# Patient Record
Sex: MALE | Race: WHITE | URBAN - METROPOLITAN AREA
[De-identification: names, ages, dates, MRNs, and addresses within clinical notes are randomized per-mention and may not be internally consistent; named-entity substitution may affect disease eponyms.]

---

## 2017-11-16 ENCOUNTER — HOSPITAL ENCOUNTER (EMERGENCY)
Facility: CLINIC | Age: 42
Discharge: HOME | End: 2017-11-16
Attending: EMERGENCY MEDICINE | Admitting: EMERGENCY MEDICINE
Payer: COMMERCIAL

## 2017-11-16 ENCOUNTER — APPOINTMENT (OUTPATIENT)
Dept: CT IMAGING | Facility: CLINIC | Age: 42
End: 2017-11-16
Attending: EMERGENCY MEDICINE
Payer: COMMERCIAL

## 2017-11-16 VITALS
OXYGEN SATURATION: 95 % | SYSTOLIC BLOOD PRESSURE: 111 MMHG | HEIGHT: 74 IN | DIASTOLIC BLOOD PRESSURE: 77 MMHG | RESPIRATION RATE: 18 BRPM | WEIGHT: 250 LBS | HEART RATE: 61 BPM | TEMPERATURE: 98.1 F | BODY MASS INDEX: 32.08 KG/M2

## 2017-11-16 DIAGNOSIS — N20.1 URETERAL STONE: ICD-10-CM

## 2017-11-16 DIAGNOSIS — N20.0 BILATERAL NEPHROLITHIASIS: ICD-10-CM

## 2017-11-16 LAB
ALBUMIN SERPL-MCNC: 4 G/DL (ref 3.4–5)
ALBUMIN UR-MCNC: 10 MG/DL
ALP SERPL-CCNC: 84 U/L (ref 40–150)
ALT SERPL W P-5'-P-CCNC: 20 U/L (ref 0–70)
ANION GAP SERPL CALCULATED.3IONS-SCNC: 8 MMOL/L (ref 3–14)
APPEARANCE UR: CLEAR
APTT PPP: 32 SEC (ref 22–37)
AST SERPL W P-5'-P-CCNC: 15 U/L (ref 0–45)
BASOPHILS # BLD AUTO: 0 10E9/L (ref 0–0.2)
BASOPHILS NFR BLD AUTO: 0.4 %
BILIRUB SERPL-MCNC: 1 MG/DL (ref 0.2–1.3)
BILIRUB UR QL STRIP: NEGATIVE
BUN SERPL-MCNC: 14 MG/DL (ref 7–30)
CALCIUM SERPL-MCNC: 8.6 MG/DL (ref 8.5–10.1)
CHLORIDE SERPL-SCNC: 107 MMOL/L (ref 94–109)
CO2 SERPL-SCNC: 26 MMOL/L (ref 20–32)
COLOR UR AUTO: YELLOW
CREAT SERPL-MCNC: 1.24 MG/DL (ref 0.66–1.25)
DIFFERENTIAL METHOD BLD: NORMAL
EOSINOPHIL # BLD AUTO: 0.3 10E9/L (ref 0–0.7)
EOSINOPHIL NFR BLD AUTO: 2.9 %
ERYTHROCYTE [DISTWIDTH] IN BLOOD BY AUTOMATED COUNT: 12.3 % (ref 10–15)
GFR SERPL CREATININE-BSD FRML MDRD: 65 ML/MIN/1.7M2
GLUCOSE SERPL-MCNC: 107 MG/DL (ref 70–99)
GLUCOSE UR STRIP-MCNC: NEGATIVE MG/DL
HCT VFR BLD AUTO: 47.5 % (ref 40–53)
HGB BLD-MCNC: 16.8 G/DL (ref 13.3–17.7)
HGB UR QL STRIP: ABNORMAL
IMM GRANULOCYTES # BLD: 0 10E9/L (ref 0–0.4)
IMM GRANULOCYTES NFR BLD: 0.2 %
INR PPP: 0.9 (ref 0.86–1.14)
KETONES UR STRIP-MCNC: NEGATIVE MG/DL
LEUKOCYTE ESTERASE UR QL STRIP: ABNORMAL
LIPASE SERPL-CCNC: 127 U/L (ref 73–393)
LYMPHOCYTES # BLD AUTO: 4.2 10E9/L (ref 0.8–5.3)
LYMPHOCYTES NFR BLD AUTO: 46.5 %
MCH RBC QN AUTO: 31.7 PG (ref 26.5–33)
MCHC RBC AUTO-ENTMCNC: 35.4 G/DL (ref 31.5–36.5)
MCV RBC AUTO: 90 FL (ref 78–100)
MONOCYTES # BLD AUTO: 0.6 10E9/L (ref 0–1.3)
MONOCYTES NFR BLD AUTO: 6.4 %
MUCOUS THREADS #/AREA URNS LPF: PRESENT /LPF
NEUTROPHILS # BLD AUTO: 3.9 10E9/L (ref 1.6–8.3)
NEUTROPHILS NFR BLD AUTO: 43.6 %
NITRATE UR QL: NEGATIVE
NRBC # BLD AUTO: 0 10*3/UL
NRBC BLD AUTO-RTO: 0 /100
PH UR STRIP: 5.5 PH (ref 5–7)
PLATELET # BLD AUTO: 206 10E9/L (ref 150–450)
POTASSIUM SERPL-SCNC: 3.4 MMOL/L (ref 3.4–5.3)
PROT SERPL-MCNC: 7.7 G/DL (ref 6.8–8.8)
RBC # BLD AUTO: 5.3 10E12/L (ref 4.4–5.9)
RBC #/AREA URNS AUTO: 15 /HPF (ref 0–2)
SODIUM SERPL-SCNC: 141 MMOL/L (ref 133–144)
SOURCE: ABNORMAL
SP GR UR STRIP: 1.02 (ref 1–1.03)
SQUAMOUS #/AREA URNS AUTO: <1 /HPF (ref 0–1)
UROBILINOGEN UR STRIP-MCNC: 2 MG/DL (ref 0–2)
WBC # BLD AUTO: 9 10E9/L (ref 4–11)
WBC #/AREA URNS AUTO: 17 /HPF (ref 0–2)

## 2017-11-16 PROCEDURE — 85730 THROMBOPLASTIN TIME PARTIAL: CPT | Performed by: EMERGENCY MEDICINE

## 2017-11-16 PROCEDURE — 87086 URINE CULTURE/COLONY COUNT: CPT | Performed by: EMERGENCY MEDICINE

## 2017-11-16 PROCEDURE — 74176 CT ABD & PELVIS W/O CONTRAST: CPT

## 2017-11-16 PROCEDURE — 96374 THER/PROPH/DIAG INJ IV PUSH: CPT

## 2017-11-16 PROCEDURE — 25000128 H RX IP 250 OP 636: Performed by: EMERGENCY MEDICINE

## 2017-11-16 PROCEDURE — 25000132 ZZH RX MED GY IP 250 OP 250 PS 637: Performed by: EMERGENCY MEDICINE

## 2017-11-16 PROCEDURE — 96375 TX/PRO/DX INJ NEW DRUG ADDON: CPT

## 2017-11-16 PROCEDURE — 85610 PROTHROMBIN TIME: CPT | Performed by: EMERGENCY MEDICINE

## 2017-11-16 PROCEDURE — 96361 HYDRATE IV INFUSION ADD-ON: CPT

## 2017-11-16 PROCEDURE — 99285 EMERGENCY DEPT VISIT HI MDM: CPT | Mod: 25

## 2017-11-16 PROCEDURE — 85025 COMPLETE CBC W/AUTO DIFF WBC: CPT | Performed by: EMERGENCY MEDICINE

## 2017-11-16 PROCEDURE — 80053 COMPREHEN METABOLIC PANEL: CPT | Performed by: EMERGENCY MEDICINE

## 2017-11-16 PROCEDURE — 83690 ASSAY OF LIPASE: CPT | Performed by: EMERGENCY MEDICINE

## 2017-11-16 PROCEDURE — 81001 URINALYSIS AUTO W/SCOPE: CPT | Performed by: EMERGENCY MEDICINE

## 2017-11-16 RX ORDER — ONDANSETRON 4 MG/1
4 TABLET, ORALLY DISINTEGRATING ORAL EVERY 8 HOURS PRN
Qty: 15 TABLET | Refills: 0 | Status: SHIPPED | OUTPATIENT
Start: 2017-11-16 | End: 2017-11-21

## 2017-11-16 RX ORDER — MORPHINE SULFATE 4 MG/ML
4 INJECTION, SOLUTION INTRAMUSCULAR; INTRAVENOUS ONCE
Status: COMPLETED | OUTPATIENT
Start: 2017-11-16 | End: 2017-11-16

## 2017-11-16 RX ORDER — HYDROCODONE BITARTRATE AND ACETAMINOPHEN 5; 325 MG/1; MG/1
1-2 TABLET ORAL EVERY 4 HOURS PRN
Qty: 15 TABLET | Refills: 0 | Status: SHIPPED | OUTPATIENT
Start: 2017-11-16

## 2017-11-16 RX ORDER — ONDANSETRON 2 MG/ML
4 INJECTION INTRAMUSCULAR; INTRAVENOUS ONCE
Status: COMPLETED | OUTPATIENT
Start: 2017-11-16 | End: 2017-11-16

## 2017-11-16 RX ORDER — TAMSULOSIN HYDROCHLORIDE 0.4 MG/1
0.4 CAPSULE ORAL AT BEDTIME
Qty: 10 CAPSULE | Refills: 0 | Status: SHIPPED | OUTPATIENT
Start: 2017-11-16 | End: 2017-11-21

## 2017-11-16 RX ORDER — HYDROCODONE BITARTRATE AND ACETAMINOPHEN 5; 325 MG/1; MG/1
2 TABLET ORAL ONCE
Status: COMPLETED | OUTPATIENT
Start: 2017-11-16 | End: 2017-11-16

## 2017-11-16 RX ORDER — KETOROLAC TROMETHAMINE 30 MG/ML
30 INJECTION, SOLUTION INTRAMUSCULAR; INTRAVENOUS ONCE
Status: DISCONTINUED | OUTPATIENT
Start: 2017-11-16 | End: 2017-11-16

## 2017-11-16 RX ORDER — NAPROXEN 500 MG/1
500 TABLET ORAL 2 TIMES DAILY WITH MEALS
Qty: 14 TABLET | Refills: 0 | Status: SHIPPED | OUTPATIENT
Start: 2017-11-16 | End: 2017-11-22

## 2017-11-16 RX ORDER — KETOROLAC TROMETHAMINE 30 MG/ML
30 INJECTION, SOLUTION INTRAMUSCULAR; INTRAVENOUS ONCE
Status: COMPLETED | OUTPATIENT
Start: 2017-11-16 | End: 2017-11-16

## 2017-11-16 RX ADMIN — ONDANSETRON 4 MG: 2 SOLUTION INTRAMUSCULAR; INTRAVENOUS at 03:03

## 2017-11-16 RX ADMIN — SODIUM CHLORIDE 1000 ML: 9 INJECTION, SOLUTION INTRAVENOUS at 02:56

## 2017-11-16 RX ADMIN — HYDROCODONE BITARTRATE AND ACETAMINOPHEN 2 TABLET: 5; 325 TABLET ORAL at 04:59

## 2017-11-16 RX ADMIN — MORPHINE SULFATE 4 MG: 4 INJECTION, SOLUTION INTRAMUSCULAR; INTRAVENOUS at 03:49

## 2017-11-16 RX ADMIN — KETOROLAC TROMETHAMINE 30 MG: 30 INJECTION, SOLUTION INTRAMUSCULAR at 02:57

## 2017-11-16 ASSESSMENT — ENCOUNTER SYMPTOMS
NAUSEA: 1
ABDOMINAL PAIN: 1
HEMATURIA: 0
FEVER: 0
DYSURIA: 0
VOMITING: 1
FLANK PAIN: 1

## 2017-11-16 NOTE — ED PROVIDER NOTES
"  History     Chief Complaint:  Flank pain     HPI   Talon Millard is a healthy 41 year old male with history of Meckel's diverticulectomy, who presents with flank pain.     Patient reports sudden onset of right flank pain about 2 hours PTA, that is now mostly located to his RLQ. He reports concomitant  No fever, no hematuria, no dysuria, no scrotal or penile pain.   No history of nephrolithiasis, but believes that is what he is suffering because his wife had similar symptoms this summer.      Allergies:  No Known Allergies     Medications:    The patient is currently on no regular medications.     Past Medical History:    History reviewed. No pertinent past medical history.    Past Surgical History:    Meckel's diverticulectomy      Family History:    History is non-contributory.     Social History:  Marital Status:    Smoking status: electronic cigarettes   Alcohol status: occasional   Patient presents with wife.     Review of Systems   Constitutional: Negative for fever.   Gastrointestinal: Positive for abdominal pain, nausea and vomiting.   Genitourinary: Positive for flank pain. Negative for dysuria, hematuria, penile pain and testicular pain.   All other systems reviewed and are negative.      Physical Exam   First Vitals:  BP: (!) 132/97  Heart Rate: 85  Temp: 98.1  F (36.7  C)  Resp: 16  Height: 188 cm (6' 2\")  Weight: 113.4 kg (250 lb)  SpO2: 100 %    Physical Exam  General:                            In distress 2/2 pain, difficulty lying still on stretcher. Nontoxic.  Head:                                 Scalp, face, and head appear normal  Eyes:                                  Pupils are equal, round, and reactive to light                                              Conjunctivae non-injected and sclerae white  ENT:                                   The external nose is normal                                              Pinnae are normal                                              The " oropharynx is normal, mucous membranes moist                                              Uvula is in the midline  Neck:                                  Normal range of motion                                              There is no rigidity noted                                              Trachea is in the midline  CV:                                      Regular rate and rhythm                                               Normal S1/S2, no S3/S4                                              No murmur or rub  Resp:                                  Lungs are clear and equal bilaterally                                              There is no tachypnea                                              No increased work of breathing                                              No rales, wheezing, or rhonchi  GI:                                       Abdomen is soft, no rigidity or guarding                                              No distension, or mass                                              + Right lower abdominal tenderness. No rebound tenderness  MS:                                      Normal muscular tone                                              Symmetric motor strength                                              No lower extremity edema  Skin:                                   No rash or acute skin lesions noted  Neuro:                                 Awake and alert                                              Speech is normal and fluent                                              Moves all extremities spontaneously  Psych:                               Normal affect.  Appropriate interactions.    Emergency Department Course     Imaging:  Radiology findings were communicated with the patient who voiced understanding of the findings.    CT Abdomen and Pelvis, without contrast (stone protocol), per radiology:    1. There is a 0.4 cm distal right ureteral stone causing mild hydronephrosis.  2. Single  small left intrarenal stone.    Laboratory:  Laboratory findings were communicated with the patient who voiced understanding of the findings.    CBC: WBC 9.0, HGB 16.8,   CMP: Creatinine 1.24    Lipase: 127    INR: 0.90  PTT: 32    UA: clear, yellow, Albumin 10, Blood trace, Nitrite negative, LE moderate, WBC 17, RBC 15, SEC <1  Urine culture: pending     Interventions:  0256: NS 1,000 mL, IV  0257: Toradol 30 mg, IV  0303: Zofran 4mg, IV  0349: Morphine, 4 mg, IV      Emergency Department Course:  Nursing notes and vitals reviewed.  I performed an exam of the patient as documented above.     A peripheral IV was established and blood was drawn for laboratory testing, results above.  CT abdomen/pelvis obtained while in the emergency department, findings above.    The patient provided a urine sample here in the emergency department. This was sent for laboratory testing, findings above.     0314, patient was rechecked. Pain was improved.   0355, patient was rechecked and updated on results. He was tolerating liquids PO.    I discussed the findings and treatment plan with the patient. He expressed understanding of this plan and consented to discharge. He will be discharged home with instructions for care and follow up. In addition, the patient will return to the emergency department if their symptoms persist, worsen, if new symptoms arise or if there is any concern.  All questions were answered.     Impression & Plan      Medical Decision Making:  Talon Millard is a 41 year old male who presents complaining of right flank, RLQ pain and vomiting.    Evaluation today was consistent with nephrolithiasis and renal colic. Based on history and exam and the above studies, I do not feel that there is evidence of other acute intraabdominal process at this time.  CT scan showed a 0.4 cm right distal ureteral stone with mild hydronephrosis. Nonobstructing left intrarenal stone evidenced as well and patient was informed of  this. UA was obtained and had mild pyuria however I would wait for positive culture prior to starting antibiotics given lack of any other infectious symptoms.  Given the presence of the kidney stone, urine culture was sent.     The patient received pain control, antiemetic and IV fluids as documented above.  Pain and nausea was well controlled with these measures. The patient was able to tolerate PO.  Based on the patient's good response to symptomatic control, I feel that this patient can be managed expectantly with close outpatient follow up within the next several days. The patient was instructed to return to the emergency department for uncontrolled pain, fever or inability to tolerate oral liquids. Prescriptions for Norco, naproxen, Zofran, Flomax and urine strainer were given.     Diagnosis:    ICD-10-CM   1. Bilateral nephrolithiasis N20.0   2. Ureteral stone N20.1     Disposition:   Discharge     Discharge Medications:  New Prescriptions    HYDROCODONE-ACETAMINOPHEN (NORCO) 5-325 MG PER TABLET    Take 1-2 tablets by mouth every 4 hours as needed for moderate to severe pain    NAPROXEN (NAPROSYN) 500 MG TABLET    Take 1 tablet (500 mg) by mouth 2 times daily (with meals) for 7 days    ONDANSETRON (ZOFRAN ODT) 4 MG ODT TAB    Take 1 tablet (4 mg) by mouth every 8 hours as needed for nausea    TAMSULOSIN (FLOMAX) 0.4 MG CAPSULE    Take 1 capsule (0.4 mg) by mouth At Bedtime for 10 doses       Scribe Disclosure:  Shantal COLVIN, am serving as a scribe at 2:55 AM on 11/16/2017 to document services personally performed by Abhi Ocasio MD, based on my observations and the provider's statements to me.     EMERGENCY DEPARTMENT       Abhi Ocasio MD  11/16/17 0512

## 2017-11-16 NOTE — ED AVS SNAPSHOT
Emergency Department    [address] [place] [address]    Phone:  [phone]    Fax:  [phone]                                       Talon Millard   MRN: [id number]    Department:   Emergency Department   Date of Visit:  11/16/2017           After Visit Summary Signature Page     I have received my discharge instructions, and my questions have been answered. I have discussed any challenges I see with this plan with the nurse or doctor.    ..........................................................................................................................................  Patient/Patient Representative Signature      ..........................................................................................................................................  Patient Representative Print Name and Relationship to Patient    ..................................................               ................................................  Date                                            Time    ..........................................................................................................................................  Reviewed by Signature/Title    ...................................................              ..............................................  Date                                                            Time

## 2017-11-16 NOTE — ED AVS SNAPSHOT
Emergency Department    [address] [facility] [address]    Phone:  [phone]    Fax:  [phone]                                       Talon Millard   MRN: [id number]    Department:   Emergency Department   Date of Visit:  11/16/2017           Patient Information     Date Of Birth          1975        Your diagnoses for this visit were:     Bilateral nephrolithiasis     Ureteral stone        You were seen by Abhi Ocasio MD.      Follow-up Information     Follow up with [facility]. Schedule an appointment as soon as possible for a visit in 3 days.    Specialty:  Family Practice    Why:  For close follow up or see your own physician    Contact information:    [address] [facility] [address] [phone]        Follow up with Isaac Slater MD.    Specialty:  Urology    Why:  Urologist: As needed if pain does not go away or you feel worse    Contact information:    [address]  [facility] [id number] [phone]          Discharge Instructions       Discharge Instructions  Kidney Stones    Kidney stones are a common problem that can cause a lot of pain but fortunately are usually not dangerous. Kidney stones form in the kidney and then can cause a blockage (obstruction) of the flow of urine from the kidney which leads to pain. Most patients can manage kidney stones at home (without a hospital stay).  However, sometimes your condition may be worse than it seemed at first, or may get worse with time. Most kidney stones will pass on their own, but occasionally stones may need to be removed by an urologist.    Generally, every Emergency Department visit should have a follow-up clinic visit with either a primary or a specialty clinic/provider. Please follow-up as instructed by your emergency provider today.      Return to the Emergency Department if:    Your pain is not controlled despite the medications provided or recommended.    You  are vomiting (throwing up) and cannot keep fluids or medications down.    You develop a fever (>100.4 F).    You feel much more ill or develop new symptoms.  What can I do to help myself?    Be sure to drink plenty of fluids.    If instructed to do so, strain your urine (pee) with the urine strainer you were provided with today. Your stone may look like a grain of sand or a small pebble. Collect any stones in the cup provided and bring to your follow-up appointment.    Staying active is good, and may help the stone to pass. You may do whatever you feel up to doing without restrictions.   Treatment:    Non-steroidal anti-inflammatory drugs (NSAIDs). This includes prescription medicines like Toradol  (ketorolac) and non-prescription medicines like Advil  (ibuprofen) and Nuprin  (ibuprofen) and Naproxen. These pain relievers are very effective for kidney stones.    Nausea (sick to your stomach) medication.  Nausea and vomiting are common with kidney stones, so your provider may send you home with medicine for this.     Flomax  (tamsulosin). This medicine is sometimes used for men with prostate problems, but also can help kidney stones to pass. Its effectiveness is controversial or questionable so it is prescribed in certain situations. This medicine can lower blood pressure, and you may feel faint/lightheaded, especially when you first stand up. Be sure to get up gradually, sit down if you feel faint, and avoid activity where feeling faint would be dangerous, such as climbing ladders.  If you were given a prescription for medicine here today, be sure to read all of the information (including the package insert) that comes with your prescription.  This will include important information about the medicine, its side effects, and any warnings that you need to know about.  The pharmacist who fills the prescription can provide more information and answer questions you may have about the medicine.  If you have questions or  concerns that the pharmacist cannot address, please call or return to the Emergency Department.   Remember that you can always come back to the Emergency Department if you are not able to see your regular provider in the amount of time listed above, if you get any new symptoms, or if there is anything that worries you.      24 Hour Appointment Hotline       To make an appointment at any [facility], call [phone] ([phone]). If you don't have a family doctor or clinic, we will help you find one. [facility] are conveniently located to serve the needs of you and your family.             Review of your medicines      START taking        Dose / Directions Last dose taken    HYDROcodone-acetaminophen 5-325 MG per tablet   Commonly known as:  NORCO   Dose:  1-2 tablet   Quantity:  15 tablet        Take 1-2 tablets by mouth every 4 hours as needed for moderate to severe pain   Refills:  0        naproxen 500 MG tablet   Commonly known as:  NAPROSYN   Dose:  500 mg   Quantity:  14 tablet        Take 1 tablet (500 mg) by mouth 2 times daily (with meals) for 7 days   Refills:  0        ondansetron 4 MG ODT tab   Commonly known as:  ZOFRAN ODT   Dose:  4 mg   Quantity:  15 tablet        Take 1 tablet (4 mg) by mouth every 8 hours as needed for nausea   Refills:  0        tamsulosin 0.4 MG capsule   Commonly known as:  FLOMAX   Dose:  0.4 mg   Quantity:  10 capsule        Take 1 capsule (0.4 mg) by mouth At Bedtime for 10 doses   Refills:  0                Prescriptions were sent or printed at these locations (4 Prescriptions)                   Other Prescriptions                Printed at Department/Unit printer (4 of 4)         naproxen (NAPROSYN) 500 MG tablet               HYDROcodone-acetaminophen (NORCO) 5-325 MG per tablet               tamsulosin (FLOMAX) 0.4 MG capsule               ondansetron (ZOFRAN ODT) 4 MG ODT tab                Procedures and tests performed during your visit     CBC with  platelets differential    CT Abdomen Pelvis without Contrast (stone protocol)    Comprehensive metabolic panel    INR    Lipase    Partial thromboplastin time    Peripheral IV: Standard    UA reflex to Microscopic and Culture    Urine Culture Aerobic Bacterial      Orders Needing Specimen Collection     None      Pending Results     Date and Time Order Name Status Description    11/16/2017 0436 Urine Culture Aerobic Bacterial In process     11/16/2017 0255 CT Abdomen Pelvis without Contrast (stone protocol) Preliminary             Pending Culture Results     Date and Time Order Name Status Description    11/16/2017 0436 Urine Culture Aerobic Bacterial In process             Pending Results Instructions     If you had any lab results that were not finalized at the time of your Discharge, you can call the ED Lab Result RN at [phone]. You will be contacted by this team for any positive Lab results or changes in treatment. The nurses are available 7 days a week from 10A to 6:30P.  You can leave a message 24 hours per day and they will return your call.        Test Results From Your Hospital Stay        11/16/2017  3:13 AM      Component Results     Component Value Ref Range & Units Status    WBC 9.0 4.0 - 11.0 10e9/L Final    RBC Count 5.30 4.4 - 5.9 10e12/L Final    Hemoglobin 16.8 13.3 - 17.7 g/dL Final    Hematocrit 47.5 40.0 - 53.0 % Final    MCV 90 78 - 100 fl Final    MCH 31.7 26.5 - 33.0 pg Final    MCHC 35.4 31.5 - 36.5 g/dL Final    RDW 12.3 10.0 - 15.0 % Final    Platelet Count 206 150 - 450 10e9/L Final    Diff Method Automated Method  Final    % Neutrophils 43.6 % Final    % Lymphocytes 46.5 % Final    % Monocytes 6.4 % Final    % Eosinophils 2.9 % Final    % Basophils 0.4 % Final    % Immature Granulocytes 0.2 % Final    Nucleated RBCs 0 0 /100 Final    Absolute Neutrophil 3.9 1.6 - 8.3 10e9/L Final    Absolute Lymphocytes 4.2 0.8 - 5.3 10e9/L Final    Absolute Monocytes 0.6 0.0 - 1.3 10e9/L Final     Absolute Eosinophils 0.3 0.0 - 0.7 10e9/L Final    Absolute Basophils 0.0 0.0 - 0.2 10e9/L Final    Abs Immature Granulocytes 0.0 0 - 0.4 10e9/L Final    Absolute Nucleated RBC 0.0  Final         11/16/2017  3:24 AM      Component Results     Component Value Ref Range & Units Status    INR 0.90 0.86 - 1.14 Final         11/16/2017  3:24 AM      Component Results     Component Value Ref Range & Units Status    PTT 32 22 - 37 sec Final         11/16/2017  3:28 AM      Component Results     Component Value Ref Range & Units Status    Sodium 141 133 - 144 mmol/L Final    Potassium 3.4 3.4 - 5.3 mmol/L Final    Chloride 107 94 - 109 mmol/L Final    Carbon Dioxide 26 20 - 32 mmol/L Final    Anion Gap 8 3 - 14 mmol/L Final    Glucose 107 (H) 70 - 99 mg/dL Final    Urea Nitrogen 14 7 - 30 mg/dL Final    Creatinine 1.24 0.66 - 1.25 mg/dL Final    GFR Estimate 65 >60 mL/min/1.7m2 Final    Non  GFR Calc    GFR Estimate If Black 78 >60 mL/min/1.7m2 Final    African American GFR Calc    Calcium 8.6 8.5 - 10.1 mg/dL Final    Bilirubin Total 1.0 0.2 - 1.3 mg/dL Final    Albumin 4.0 3.4 - 5.0 g/dL Final    Protein Total 7.7 6.8 - 8.8 g/dL Final    Alkaline Phosphatase 84 40 - 150 U/L Final    ALT 20 0 - 70 U/L Final    AST 15 0 - 45 U/L Final         11/16/2017  3:26 AM      Component Results     Component Value Ref Range & Units Status    Lipase 127 73 - 393 U/L Final         11/16/2017  4:47 AM      Component Results     Component Value Ref Range & Units Status    Color Urine Yellow  Final    Appearance Urine Clear  Final    Glucose Urine Negative NEG^Negative mg/dL Final    Bilirubin Urine Negative NEG^Negative Final    Ketones Urine Negative NEG^Negative mg/dL Final    Specific Gravity Urine 1.025 1.003 - 1.035 Final    Blood Urine Trace (A) NEG^Negative Final    pH Urine 5.5 5.0 - 7.0 pH Final    Protein Albumin Urine 10 (A) NEG^Negative mg/dL Final    Urobilinogen mg/dL 2.0 0.0 - 2.0 mg/dL Final    Nitrite  Urine Negative NEG^Negative Final    Leukocyte Esterase Urine Moderate (A) NEG^Negative Final    Source Midstream Urine  Final    RBC Urine 15 (H) 0 - 2 /HPF Final    WBC Urine 17 (H) 0 - 2 /HPF Final    Squamous Epithelial /HPF Urine <1 0 - 1 /HPF Final    Mucous Urine Present (A) NEG^Negative /LPF Final         11/16/2017  3:35 AM      Narrative     CT ABDOMEN PELVIS W/O CONTRAST  11/16/2017 3:29 AM      HISTORY: Right flank pain.     TECHNIQUE: Imaging performed from the diaphragm to the base of the  bladder using the renal stone protocol. No oral or intravenous  contrast. Radiation dose for this scan was reduced using automated  exposure control, adjustment of the mA and/or kV according to patient  size, or iterative reconstruction technique.     COMPARISON: None.    FINDINGS:  Abdomen: There is mild dilatation of the right renal collecting system  and ureter into the pelvis where there is a 0.4 cm distal ureteral  stone approximately 2 cm above the ureterovesical junction. No other  right-sided urinary stones. There is a single 0.4 cm stone in the  upper pole of the left kidney.    There is minimal dependent atelectasis bilaterally. The heart size is  normal. Evaluation of the solid abdominal organs is limited by the  lack of intravenous contrast. The liver, spleen, gallbladder, pancreas  and adrenal glands are normal in appearance. There is no abdominal or  pelvic lymph node enlargement.    Pelvis: There are postoperative changes in small bowel in the left  abdomen. No bowel obstruction or inflammation. No free intraperitoneal  gas or fluid.        Impression     IMPRESSION:  1. There is a 0.4 cm distal right ureteral stone causing mild  hydronephrosis.  2. Single small left intrarenal stone.         11/16/2017  4:48 AM                Clinical Quality Measure: Blood Pressure Screening     Your blood pressure was checked while you were in the emergency department today. The last reading we obtained was  BP:  "119/84 . Please read the guidelines below about what these numbers mean and what you should do about them.  If your systolic blood pressure (the top number) is less than 120 and your diastolic blood pressure (the bottom number) is less than 80, then your blood pressure is normal. There is nothing more that you need to do about it.  If your systolic blood pressure (the top number) is 120-139 or your diastolic blood pressure (the bottom number) is 80-89, your blood pressure may be higher than it should be. You should have your blood pressure rechecked within a year by a primary care provider.  If your systolic blood pressure (the top number) is 140 or greater or your diastolic blood pressure (the bottom number) is 90 or greater, you may have high blood pressure. High blood pressure is treatable, but if left untreated over time it can put you at risk for heart attack, stroke, or kidney failure. You should have your blood pressure rechecked by a primary care provider within the next 4 weeks.  If your provider in the emergency department today gave you specific instructions to follow-up with your doctor or provider even sooner than that, you should follow that instruction and not wait for up to 4 weeks for your follow-up visit.        Thank you for choosing [facility]       Thank you for choosing [facility] for your care. Our goal is always to provide you with excellent care. Hearing back from our patients is one way we can continue to improve our services. Please take a few minutes to complete the written survey that you may receive in the mail after you visit with us. Thank you!        [employer] Information     [employer] lets you send messages to your doctor, view your test results, renew your prescriptions, schedule appointments and more. To sign up, go to [email or web address] . Click on \"Log in\" on the left side of the screen, which will take you to the Welcome page. Then click on \"Sign up Now\" on the right side of the " page.     You will be asked to enter the access code listed below, as well as some personal information. Please follow the directions to create your username and password.     Your access code is: [email or web address]  Expires: 2018  5:17 AM     Your access code will  in 90 days. If you need help or a new code, please call your [facility] clinic or [phone].        Care EveryWhere ID     This is your Care EveryWhere ID. This could be used by other organizations to access your [facility] medical records  [id number]        Equal Access to Services     [facility]: Cornelius Noriega, anny bonilla, renee liu, dorina yadav . So [facility] [phone].    ATENCIÓN: Si habla español, tiene a anne disposición servicios gratuitos de asistencia lingüística. Llame al [phone].    We comply with applicable federal civil rights laws and [place] laws. We do not discriminate on the basis of race, color, national origin, age, disability, sex, sexual orientation, or gender identity.            After Visit Summary       This is your record. Keep this with you and show to your community pharmacist(s) and doctor(s) at your next visit.

## 2017-11-17 LAB
BACTERIA SPEC CULT: NORMAL
Lab: NORMAL
SPECIMEN SOURCE: NORMAL

## 2017-11-19 ENCOUNTER — NURSE TRIAGE (OUTPATIENT)
Dept: NURSING | Facility: CLINIC | Age: 42
End: 2017-11-19

## 2017-11-19 ENCOUNTER — HOSPITAL ENCOUNTER (EMERGENCY)
Facility: CLINIC | Age: 42
Discharge: HOME | End: 2017-11-20
Attending: EMERGENCY MEDICINE | Admitting: EMERGENCY MEDICINE
Payer: COMMERCIAL

## 2017-11-19 DIAGNOSIS — N20.1 URETEROLITHIASIS: ICD-10-CM

## 2017-11-19 DIAGNOSIS — N23 RENAL COLIC: ICD-10-CM

## 2017-11-19 PROCEDURE — 99285 EMERGENCY DEPT VISIT HI MDM: CPT | Mod: 25

## 2017-11-19 PROCEDURE — 51798 US URINE CAPACITY MEASURE: CPT

## 2017-11-19 PROCEDURE — 96375 TX/PRO/DX INJ NEW DRUG ADDON: CPT

## 2017-11-19 PROCEDURE — 96374 THER/PROPH/DIAG INJ IV PUSH: CPT

## 2017-11-19 RX ORDER — HYDROMORPHONE HYDROCHLORIDE 1 MG/ML
0.5 INJECTION, SOLUTION INTRAMUSCULAR; INTRAVENOUS; SUBCUTANEOUS
Status: DISCONTINUED | OUTPATIENT
Start: 2017-11-19 | End: 2017-11-20 | Stop reason: HOSPADM

## 2017-11-19 NOTE — ED AVS SNAPSHOT
Emergency Department    [address] [facility] [address]    Phone:  [phone]    Fax:  [phone]                                       Talon Millard   MRN: [id number]    Department:   Emergency Department   Date of Visit:  11/19/2017           Patient Information     Date Of Birth          1975        Your diagnoses for this visit were:     Ureterolithiasis     Renal colic        You were seen by Nicole Kidd MD.      Follow-up Information     Follow up with Isaac Slater MD. Call in 1 day.    Specialty:  Urology    Contact information:    [address] [address] [phone]          Follow up with  Emergency Department.    Specialty:  EMERGENCY MEDICINE    Why:  If symptoms worsen    Contact information:    [address] [facility] [address] [phone]        Discharge Instructions       Follow up with urology. Call tomorrow.  Continue Norco as needed for severe pain and naproxen as needed for mild-moderate pain. Consider stool softener as Norco will make you constipated.  Continue Flomax at night, but discontinue if you have lightheadedness.  Continue to strain your urine.  Continue Zofran as needed for nausea or vomiting.   Return with uncontrolled pain or fever >100.4F or if you have any other concerns.    Discharge References/Attachments     KIDNEY STONES, TREATING: EXPECTANT THERAPY (ENGLISH)    KIDNEY STONE W/ COLIC (ENGLISH)      24 Hour Appointment Hotline       To make an appointment at any [facility], call [phone] ([phone]). If you don't have a family doctor or clinic, we will help you find one. [facility] are conveniently located to serve the needs of you and your family.             Review of your medicines      CONTINUE these medicines which may have CHANGED, or have new prescriptions. If we are uncertain of the size of tablets/capsules you have at home, strength may be listed as something  that might have changed.        Dose / Directions Last dose taken    * HYDROcodone-acetaminophen 5-325 MG per tablet   Commonly known as:  NORCO   Dose:  1-2 tablet   What changed:  Another medication with the same name was added. Make sure you understand how and when to take each.   Quantity:  15 tablet        Take 1-2 tablets by mouth every 4 hours as needed for moderate to severe pain   Refills:  0        * HYDROcodone-acetaminophen 5-325 MG per tablet   Commonly known as:  NORCO   Dose:  1 tablet   What changed:  You were already taking a medication with the same name, and this prescription was added. Make sure you understand how and when to take each.   Quantity:  8 tablet        Take 1 tablet by mouth every 6 hours as needed for moderate to severe pain   Refills:  0        * Notice:  This list has 2 medication(s) that are the same as other medications prescribed for you. Read the directions carefully, and ask your doctor or other care provider to review them with you.      Our records show that you are taking the medicines listed below. If these are incorrect, please call your family doctor or clinic.        Dose / Directions Last dose taken    naproxen 500 MG tablet   Commonly known as:  NAPROSYN   Dose:  500 mg   Quantity:  14 tablet        Take 1 tablet (500 mg) by mouth 2 times daily (with meals) for 7 days   Refills:  0        ondansetron 4 MG ODT tab   Commonly known as:  ZOFRAN ODT   Dose:  4 mg   Quantity:  15 tablet        Take 1 tablet (4 mg) by mouth every 8 hours as needed for nausea   Refills:  0        tamsulosin 0.4 MG capsule   Commonly known as:  FLOMAX   Dose:  0.4 mg   Quantity:  10 capsule        Take 1 capsule (0.4 mg) by mouth At Bedtime for 10 doses   Refills:  0                Prescriptions were sent or printed at these locations (1 Prescription)                   Other Prescriptions                Printed at Department/Unit printer (1 of 1)         HYDROcodone-acetaminophen (NORCO)  5-325 MG per tablet                Procedures and tests performed during your visit     Basic metabolic panel    Bladder scan    CBC with platelets differential    CRP inflammation    Hepatic panel    Lipase    Retroperitoneal US    UA with Microscopic      Orders Needing Specimen Collection     None      Pending Results     No orders found for last 3 day(s).            Pending Culture Results     No orders found for last 3 day(s).            Pending Results Instructions     If you had any lab results that were not finalized at the time of your Discharge, you can call the ED Lab Result RN at [phone]. You will be contacted by this team for any positive Lab results or changes in treatment. The nurses are available 7 days a week from 10A to 6:30P.  You can leave a message 24 hours per day and they will return your call.        Test Results From Your Hospital Stay        11/20/2017  1:34 AM      Component Results     Component Value Ref Range & Units Status    WBC 7.2 4.0 - 11.0 10e9/L Final    RBC Count 5.03 4.4 - 5.9 10e12/L Final    Hemoglobin 16.0 13.3 - 17.7 g/dL Final    Hematocrit 44.6 40.0 - 53.0 % Final    MCV 89 78 - 100 fl Final    MCH 31.8 26.5 - 33.0 pg Final    MCHC 35.9 31.5 - 36.5 g/dL Final    RDW 12.3 10.0 - 15.0 % Final    Platelet Count 185 150 - 450 10e9/L Final    Diff Method Automated Method  Final    % Neutrophils 53.6 % Final    % Lymphocytes 30.4 % Final    % Monocytes 12.1 % Final    % Eosinophils 3.1 % Final    % Basophils 0.7 % Final    % Immature Granulocytes 0.1 % Final    Nucleated RBCs 0 0 /100 Final    Absolute Neutrophil 3.9 1.6 - 8.3 10e9/L Final    Absolute Lymphocytes 2.2 0.8 - 5.3 10e9/L Final    Absolute Monocytes 0.9 0.0 - 1.3 10e9/L Final    Absolute Eosinophils 0.2 0.0 - 0.7 10e9/L Final    Absolute Basophils 0.1 0.0 - 0.2 10e9/L Final    Abs Immature Granulocytes 0.0 0 - 0.4 10e9/L Final    Absolute Nucleated RBC 0.0  Final         11/20/2017  1:26 AM      Component  Results     Component Value Ref Range & Units Status    Sodium 140 133 - 144 mmol/L Final    Potassium 3.7 3.4 - 5.3 mmol/L Final    Chloride 106 94 - 109 mmol/L Final    Carbon Dioxide 27 20 - 32 mmol/L Final    Anion Gap 7 3 - 14 mmol/L Final    Glucose 92 70 - 99 mg/dL Final    Urea Nitrogen 14 7 - 30 mg/dL Final    Creatinine 1.39 (H) 0.66 - 1.25 mg/dL Final    GFR Estimate 57 (L) >60 mL/min/1.7m2 Final    Non  GFR Calc    GFR Estimate If Black 69 >60 mL/min/1.7m2 Final    African American GFR Calc    Calcium 8.8 8.5 - 10.1 mg/dL Final         11/20/2017  1:28 AM      Component Results     Component Value Ref Range & Units Status    CRP Inflammation 6.1 0.0 - 8.0 mg/L Final         11/20/2017  1:22 AM      Component Results     Component Value Ref Range & Units Status    Color Urine Yellow  Final    Appearance Urine Clear  Final    Glucose Urine Negative NEG^Negative mg/dL Final    Bilirubin Urine Negative NEG^Negative Final    Ketones Urine Negative NEG^Negative mg/dL Final    Specific Gravity Urine 1.013 1.003 - 1.035 Final    Blood Urine Small (A) NEG^Negative Final    pH Urine 7.0 5.0 - 7.0 pH Final    Protein Albumin Urine Negative NEG^Negative mg/dL Final    Urobilinogen mg/dL Normal 0.0 - 2.0 mg/dL Final    Nitrite Urine Negative NEG^Negative Final    Leukocyte Esterase Urine Negative NEG^Negative Final    Source Midstream Urine  Final    WBC Urine 1 0 - 2 /HPF Final    RBC Urine 1 0 - 2 /HPF Final    Mucous Urine Present (A) NEG^Negative /LPF Final         11/20/2017  1:26 AM      Component Results     Component Value Ref Range & Units Status    Lipase 99 73 - 393 U/L Final         11/20/2017  1:28 AM      Component Results     Component Value Ref Range & Units Status    Bilirubin Direct 0.1 0.0 - 0.2 mg/dL Final    Bilirubin Total 1.2 0.2 - 1.3 mg/dL Final    Albumin 3.8 3.4 - 5.0 g/dL Final    Protein Total 7.5 6.8 - 8.8 g/dL Final    Alkaline Phosphatase 79 40 - 150 U/L Final    ALT 20  0 - 70 U/L Final    AST 16 0 - 45 U/L Final         11/20/2017  1:43 AM      Narrative     US RENAL COMPLETE 11/20/2017 1:29 AM     INDICATION: Flank pain. Evaluate ureterolithiasis, hydronephrosis,  urinary retention. Recent diagnosis of ureterolithiasis, improvement  in pain until tonight, now with hematuria, small stream.     COMPARISON: CT 11/16/2017.    FINDINGS: Again seen is mild right hydronephrosis with  pelvocaliectasis. The right ureter is not demonstrated. Small cysts in  the left kidney measuring up to 1.3 cm. No suspicious renal masses. No  hydronephrosis on the left. Right kidney measures 12.6 cm  [id number]; left kidney measures 11.4 cm. Urinary bladder is empty  and not well visualized.        Impression     IMPRESSION: Mild right hydronephrosis.    KERWIN COYLE MD                Clinical Quality Measure: Blood Pressure Screening     Your blood pressure was checked while you were in the emergency department today. The last reading we obtained was  BP: 112/78 . Please read the guidelines below about what these numbers mean and what you should do about them.  If your systolic blood pressure (the top number) is less than 120 and your diastolic blood pressure (the bottom number) is less than 80, then your blood pressure is normal. There is nothing more that you need to do about it.  If your systolic blood pressure (the top number) is 120-139 or your diastolic blood pressure (the bottom number) is 80-89, your blood pressure may be higher than it should be. You should have your blood pressure rechecked within a year by a primary care provider.  If your systolic blood pressure (the top number) is 140 or greater or your diastolic blood pressure (the bottom number) is 90 or greater, you may have high blood pressure. High blood pressure is treatable, but if left untreated over time it can put you at risk for heart attack, stroke, or kidney failure. You should have your blood pressure rechecked by a  "primary care provider within the next 4 weeks.  If your provider in the emergency department today gave you specific instructions to follow-up with your doctor or provider even sooner than that, you should follow that instruction and not wait for up to 4 weeks for your follow-up visit.        Thank you for choosing [facility]       Thank you for choosing [facility] for your care. Our goal is always to provide you with excellent care. Hearing back from our patients is one way we can continue to improve our services. Please take a few minutes to complete the written survey that you may receive in the mail after you visit with us. Thank you!        "[employer] Information     "[employer] lets you send messages to your doctor, view your test results, renew your prescriptions, schedule appointments and more. To sign up, go to [email or web address] . Click on \"Log in\" on the left side of the screen, which will take you to the Welcome page. Then click on \"Sign up Now\" on the right side of the page.     You will be asked to enter the access code listed below, as well as some personal information. Please follow the directions to create your username and password.     Your access code is: [email or web address]  Expires: 2018  5:17 AM     Your access code will  in 90 days. If you need help or a new code, please call your [facility] clinic or [phone].        Care EveryWhere ID     This is your Care EveryWhere ID. This could be used by other organizations to access your [facility] medical records  [id number]        Equal Access to Services     DUNCAN COUGHLIN : Hadii devon condono [place], waaxda luqadaha, qaybta kaalmada noe, dorina yadav . So [facility] [phone].    ATENCIÓN: Si habla español, tiene a anne disposición servicios gratuitos de asistencia lingüística. Llame al [phone].    We comply with applicable federal civil rights laws and [place] laws. We do not discriminate on the basis of " race, color, national origin, age, disability, sex, sexual orientation, or gender identity.            After Visit Summary       This is your record. Keep this with you and show to your community pharmacist(s) and doctor(s) at your next visit.

## 2017-11-19 NOTE — ED AVS SNAPSHOT
Emergency Department    [address] [place] [address]    Phone:  [phone]    Fax:  [phone]                                       Talon Millard   MRN: [id number]    Department:   Emergency Department   Date of Visit:  11/19/2017           After Visit Summary Signature Page     I have received my discharge instructions, and my questions have been answered. I have discussed any challenges I see with this plan with the nurse or doctor.    ..........................................................................................................................................  Patient/Patient Representative Signature      ..........................................................................................................................................  Patient Representative Print Name and Relationship to Patient    ..................................................               ................................................  Date                                            Time    ..........................................................................................................................................  Reviewed by Signature/Title    ...................................................              ..............................................  Date                                                            Time

## 2017-11-20 ENCOUNTER — APPOINTMENT (OUTPATIENT)
Dept: ULTRASOUND IMAGING | Facility: CLINIC | Age: 42
End: 2017-11-20
Attending: EMERGENCY MEDICINE
Payer: COMMERCIAL

## 2017-11-20 VITALS
BODY MASS INDEX: 32.35 KG/M2 | WEIGHT: 252 LBS | OXYGEN SATURATION: 96 % | DIASTOLIC BLOOD PRESSURE: 78 MMHG | RESPIRATION RATE: 16 BRPM | TEMPERATURE: 97.5 F | SYSTOLIC BLOOD PRESSURE: 112 MMHG

## 2017-11-20 LAB
ALBUMIN SERPL-MCNC: 3.8 G/DL (ref 3.4–5)
ALBUMIN UR-MCNC: NEGATIVE MG/DL
ALP SERPL-CCNC: 79 U/L (ref 40–150)
ALT SERPL W P-5'-P-CCNC: 20 U/L (ref 0–70)
ANION GAP SERPL CALCULATED.3IONS-SCNC: 7 MMOL/L (ref 3–14)
APPEARANCE UR: CLEAR
AST SERPL W P-5'-P-CCNC: 16 U/L (ref 0–45)
BASOPHILS # BLD AUTO: 0.1 10E9/L (ref 0–0.2)
BASOPHILS NFR BLD AUTO: 0.7 %
BILIRUB DIRECT SERPL-MCNC: 0.1 MG/DL (ref 0–0.2)
BILIRUB SERPL-MCNC: 1.2 MG/DL (ref 0.2–1.3)
BILIRUB UR QL STRIP: NEGATIVE
BUN SERPL-MCNC: 14 MG/DL (ref 7–30)
CALCIUM SERPL-MCNC: 8.8 MG/DL (ref 8.5–10.1)
CHLORIDE SERPL-SCNC: 106 MMOL/L (ref 94–109)
CO2 SERPL-SCNC: 27 MMOL/L (ref 20–32)
COLOR UR AUTO: YELLOW
CREAT SERPL-MCNC: 1.39 MG/DL (ref 0.66–1.25)
CRP SERPL-MCNC: 6.1 MG/L (ref 0–8)
DIFFERENTIAL METHOD BLD: NORMAL
EOSINOPHIL # BLD AUTO: 0.2 10E9/L (ref 0–0.7)
EOSINOPHIL NFR BLD AUTO: 3.1 %
ERYTHROCYTE [DISTWIDTH] IN BLOOD BY AUTOMATED COUNT: 12.3 % (ref 10–15)
GFR SERPL CREATININE-BSD FRML MDRD: 57 ML/MIN/1.7M2
GLUCOSE SERPL-MCNC: 92 MG/DL (ref 70–99)
GLUCOSE UR STRIP-MCNC: NEGATIVE MG/DL
HCT VFR BLD AUTO: 44.6 % (ref 40–53)
HGB BLD-MCNC: 16 G/DL (ref 13.3–17.7)
HGB UR QL STRIP: ABNORMAL
IMM GRANULOCYTES # BLD: 0 10E9/L (ref 0–0.4)
IMM GRANULOCYTES NFR BLD: 0.1 %
KETONES UR STRIP-MCNC: NEGATIVE MG/DL
LEUKOCYTE ESTERASE UR QL STRIP: NEGATIVE
LIPASE SERPL-CCNC: 99 U/L (ref 73–393)
LYMPHOCYTES # BLD AUTO: 2.2 10E9/L (ref 0.8–5.3)
LYMPHOCYTES NFR BLD AUTO: 30.4 %
MCH RBC QN AUTO: 31.8 PG (ref 26.5–33)
MCHC RBC AUTO-ENTMCNC: 35.9 G/DL (ref 31.5–36.5)
MCV RBC AUTO: 89 FL (ref 78–100)
MONOCYTES # BLD AUTO: 0.9 10E9/L (ref 0–1.3)
MONOCYTES NFR BLD AUTO: 12.1 %
MUCOUS THREADS #/AREA URNS LPF: PRESENT /LPF
NEUTROPHILS # BLD AUTO: 3.9 10E9/L (ref 1.6–8.3)
NEUTROPHILS NFR BLD AUTO: 53.6 %
NITRATE UR QL: NEGATIVE
NRBC # BLD AUTO: 0 10*3/UL
NRBC BLD AUTO-RTO: 0 /100
PH UR STRIP: 7 PH (ref 5–7)
PLATELET # BLD AUTO: 185 10E9/L (ref 150–450)
POTASSIUM SERPL-SCNC: 3.7 MMOL/L (ref 3.4–5.3)
PROT SERPL-MCNC: 7.5 G/DL (ref 6.8–8.8)
RBC # BLD AUTO: 5.03 10E12/L (ref 4.4–5.9)
RBC #/AREA URNS AUTO: 1 /HPF (ref 0–2)
SODIUM SERPL-SCNC: 140 MMOL/L (ref 133–144)
SOURCE: ABNORMAL
SP GR UR STRIP: 1.01 (ref 1–1.03)
UROBILINOGEN UR STRIP-MCNC: NORMAL MG/DL (ref 0–2)
WBC # BLD AUTO: 7.2 10E9/L (ref 4–11)
WBC #/AREA URNS AUTO: 1 /HPF (ref 0–2)

## 2017-11-20 PROCEDURE — 76770 US EXAM ABDO BACK WALL COMP: CPT

## 2017-11-20 PROCEDURE — 86140 C-REACTIVE PROTEIN: CPT | Performed by: EMERGENCY MEDICINE

## 2017-11-20 PROCEDURE — 81001 URINALYSIS AUTO W/SCOPE: CPT | Performed by: EMERGENCY MEDICINE

## 2017-11-20 PROCEDURE — 83690 ASSAY OF LIPASE: CPT | Performed by: EMERGENCY MEDICINE

## 2017-11-20 PROCEDURE — 25000128 H RX IP 250 OP 636: Performed by: EMERGENCY MEDICINE

## 2017-11-20 PROCEDURE — 85025 COMPLETE CBC W/AUTO DIFF WBC: CPT | Performed by: EMERGENCY MEDICINE

## 2017-11-20 PROCEDURE — 80048 BASIC METABOLIC PNL TOTAL CA: CPT | Performed by: EMERGENCY MEDICINE

## 2017-11-20 PROCEDURE — 80076 HEPATIC FUNCTION PANEL: CPT | Performed by: EMERGENCY MEDICINE

## 2017-11-20 RX ORDER — ONDANSETRON 2 MG/ML
4 INJECTION INTRAMUSCULAR; INTRAVENOUS ONCE
Status: COMPLETED | OUTPATIENT
Start: 2017-11-20 | End: 2017-11-20

## 2017-11-20 RX ORDER — KETOROLAC TROMETHAMINE 30 MG/ML
15 INJECTION, SOLUTION INTRAMUSCULAR; INTRAVENOUS ONCE
Status: COMPLETED | OUTPATIENT
Start: 2017-11-20 | End: 2017-11-20

## 2017-11-20 RX ORDER — HYDROCODONE BITARTRATE AND ACETAMINOPHEN 5; 325 MG/1; MG/1
1 TABLET ORAL EVERY 6 HOURS PRN
Qty: 8 TABLET | Refills: 0 | Status: SHIPPED | OUTPATIENT
Start: 2017-11-20 | End: 2017-11-22

## 2017-11-20 RX ADMIN — ONDANSETRON 4 MG: 2 INJECTION INTRAMUSCULAR; INTRAVENOUS at 00:48

## 2017-11-20 RX ADMIN — KETOROLAC TROMETHAMINE 15 MG: 30 INJECTION, SOLUTION INTRAMUSCULAR at 00:51

## 2017-11-20 RX ADMIN — HYDROMORPHONE HYDROCHLORIDE 0.5 MG: 1 INJECTION, SOLUTION INTRAMUSCULAR; INTRAVENOUS; SUBCUTANEOUS at 00:45

## 2017-11-20 ASSESSMENT — ENCOUNTER SYMPTOMS
FLANK PAIN: 1
NAUSEA: 1
CONSTIPATION: 0
HEMATURIA: 1
VOMITING: 0

## 2017-11-20 NOTE — DISCHARGE INSTRUCTIONS
Follow up with urology. Call tomorrow.  Continue Norco as needed for severe pain and naproxen as needed for mild-moderate pain. Consider stool softener as Norco will make you constipated.  Continue Flomax at night, but discontinue if you have lightheadedness.  Continue to strain your urine.  Continue Zofran as needed for nausea or vomiting.   Return with uncontrolled pain or fever >100.4F or if you have any other concerns.

## 2017-11-20 NOTE — TELEPHONE ENCOUNTER
"Wife Ursula calling concerned about Talon's \"blood in his urine\". He reports \"I just had dark red blood in my urine, just before we called\" and \"I was recently in the ER for kidney stones\". Talon also reports \"everything seemed relatively normal earlier today, a little less amount of urine but no blood, it has been light yellow, clear with no pain or burning\". He denies fever, recent groin injury, feeling lightheaded or like passing out, low back pain, diarrhea, or abdominal pain. Talon does report having \"an upset stomach since I was at the ER, but it has remained the same and not worsened.\" His \"last vomiting was a couple days ago.\" Per EPIC, Talon was seen in ER on 11/16/17 (diagnosed with bilateral nephrolithiasis and ureteral stone) and he reports \"I haven't followed up with a doctor since then, my doctor left my old clinic, I usually go to [facility].\" When asked about blood thinners, he reports \"I was prescribed naproxen for the pain and I've taken a couple Advil.\" Talon is \"not sure\" if he has passed a stone as he reports he is \"not using the urine strainer\". Care advice given per guideline protocol; advised Talon to be seen by a provider within the next 24 hours. Talon verbalized understanding of care advice given and plans to \"go to urgent care after work tomorrow\". Encouraged Talon to call [facility] back or be seen sooner if his symptoms worsen. Also encouraged him to obtain a new PCP.     Elizabeth Pedraza RN  [facility]      Reason for Disposition    Pain or burning with passing urine    Additional Information    Negative: Shock suspected (e.g., cold/pale/clammy skin, too weak to stand, low BP, rapid pulse)    Negative: Sounds like a life-threatening emergency to the triager    Negative: Urinary catheter, questions about    Negative: Recent back or abdominal injury    Negative: Recent genital injury    Negative: [1] Unable to urinate (or only a few drops) > 4 hours AND [2] bladder feels " "very full (e.g., palpable bladder or strong urge to urinate)    Negative: Passing pure blood or large blood clots (i.e., size > a dime) (Exception: hilda or small strands)    Negative: Fever > 100.5 F (38.1 C)    Negative: Patient sounds very sick or weak to the triager    Negative: Known sickle cell disease    Negative: Taking Coumadin (warfarin) or other strong blood thinner, or known bleeding disorder (e.g., thrombocytopenia)    Negative: Side (flank) or back pain present    Answer Assessment - Initial Assessment Questions  1. COLOR of URINE: \"Describe the color of the urine.\"  (e.g., tea-colored, pink, red, blood clots, bloody)      Dark red  2. ONSET: \"When did the bleeding start?\"       Just before call  3. EPISODES: \"How many times has there been blood in the urine?\" or \"How many times today?\"      once  4. PAIN with URINATION: \"Is there any pain with passing your urine?\" If so, ask: \"How bad is the pain?\"  (Scale 1-10; or mild, moderate, severe)     - MILD - complains slightly about urination hurting     - MODERATE - interferes with normal activities       - SEVERE - excruciating, unwilling or unable to urinate because of the pain       4/10, more of a discomfort  5. FEVER: \"Do you have a fever?\" If so, ask: \"What is your temperature, how was it measured, and when did it start?\"      denies  6. ASSOCIATED SYMPTOMS: \"Are you passing urine more frequently than usual?\"      no  7. OTHER SYMPTOMS: \"Do you have any other symptoms?\" (e.g., back/flank pain, abdominal pain, vomiting)      Upset stomach since ER visit, hasn't worsened  8. PREGNANCY: \"Is there any chance you are pregnant?\" \"When was your last menstrual period?\"      male    Protocols used: URINE - BLOOD IN-ADULT-AH    "

## 2017-11-20 NOTE — TELEPHONE ENCOUNTER
"  Reason for Disposition    [1] Unable to urinate (or only a few drops) > 4 hours AND [2] bladder feels very full (e.g., palpable bladder or strong urge to urinate)    Additional Information    Negative: Shock suspected (e.g., cold/pale/clammy skin, too weak to stand, low BP, rapid pulse)    Negative: Sounds like a life-threatening emergency to the triager    Negative: Urinary catheter, questions about    Negative: Recent back or abdominal injury    Negative: Recent genital injury    Answer Assessment - Initial Assessment Questions  1. COLOR of URINE: \"Describe the color of the urine.\"  (e.g., tea-colored, pink, red, blood clots, bloody) Yellow now, was red earlier      2. ONSET: \"When did the bleeding start?\"        Blood in urine off and on since  Stones were discovered   3. EPISODES: \"How many times has there been blood in the urine?\" or \"How many times today?\"       Once today  4. PAIN with URINATION: \"Is there any pain with passing your urine?\" If so, ask: \"How bad is the pain?\"  (Scale 1-10; or mild, moderate, severe)     - MILD - complains slightly about urination hurting     - MODERATE - interferes with normal activities       - SEVERE - excruciating, unwilling or unable to urinate because of the pain       Moderate discomfort,  Unable to pass  More than a thimble full of urine  At recent attempt   5. FEVER: \"Do you have a fever?\" If so, ask: \"What is your temperature, how was it measured, and when did it start?\"       Does not feel watm, has not measured it   6. ASSOCIATED SYMPTOMS: \"Are you passing urine more frequently than usual?\"       Feels he never empties his bladder fully , very little out put, now had low back pain ion the right side   7. OTHER SYMPTOMS: \"Do you have any other symptoms?\" (e.g., back/flank pain, abdominal pain, vomiting)      Flank pain, restless and cant relax   8. PREGNANCY: \"Is there any chance you are pregnant?\" \"When was your last menstrual period?\"      NA    Protocols used: " URINE - BLOOD IN-ADULT-  Talon calls again tonrian, with additional concerns, he  No longer has the red color to urine, but now cannot release urine, tried to pee, and thimble sized amount was all he got. He feels as if he cant empty fully. Bladder  Is not acutely full , but he feels he  Still needs to go . He also has onset of right sided flank pain since first call tonight . Pain on scale at 2 , but cant settle down, has not been able to relax and sleep, toss and turn, and constant awareness of bladder . Recommendation is to be seen tonight . He is considering his options, and will not commit to a choice during the call.

## 2017-11-20 NOTE — ED PROVIDER NOTES
History     Chief Complaint:  Flank Pain       The history is provided by the patient.     Talon Millard is a 41 year old male who presents to the emergency department for evaluation of flank pain. Patient was seen in this ER Thursday and diagnosed with a kidney stone. Discharged with expectant management. He notes he had improvement in his symptoms yesterday, and his urine stream seemed stronger so he thought be passed the stone (was not consistently straining urine). However, tonight he had an episode of hematuria and then had return of more flank pain. Since that time, patient has urinated without hematuria, but believes his stream is weaker again and with return of pain is concerned. Denies fever, diarrhea, constipation. Pain is moderate, radiating toward groin, improved with Norco, with no exacerbating symptoms.    Allergies:  No known Drug Allergies     Medications:    Hydrocodone-acetaminophen (NORCO) 5-325 MG per tablet  naproxen (NAPROSYN) 500 MG tablet  Hydrocodone-acetaminophen (NORCO) 5-325 MG per tablet  tamsulosin (FLOMAX) 0.4 MG capsule  ondansetron (ZOFRAN ODT) 4 MG ODT tab    Past Medical History:    History reviewed. No pertinent past medical history.    Past Surgical History:    History reviewed. No pertinent surgical history.    Family History:    History reviewed. No pertinent family history.    Social History:  Marital Status:     Social History   Substance Use Topics     Smoking status: Light Tobacco Smoker     Smokeless tobacco: Never Used     Alcohol use Yes        Review of Systems   Gastrointestinal: Positive for nausea. Negative for constipation and vomiting.   Genitourinary: Positive for flank pain and hematuria.   All other systems reviewed and are negative.      Physical Exam   First Vitals:  BP: (!) 144/93  Heart Rate: 68  Temp: 97.5  F (36.4  C)  Resp: 16  Weight: 114.3 kg (252 lb)  SpO2: 98 %    Physical Exam  General: Well-developed and well-nourished; well appearing young   man; cooperative  Head:  Atraumatic  Eyes:  Extraocular movements intact; conjunctivae, lids, and sclerae are normal  ENT:    Normal nose; moist mucous membranes  Neck:  Supple; normal range of motion  CV:  Regular rate and rhythm; normal heart sounds with no murmurs, rubs, or gallops detected  Resp:  No respiratory distress; clear to auscultation bilaterally without decreased breath sounds, wheezing, rales, or rhonchi  GI:  Soft; non-distended; minimally tender in the RLQ and minimal tenderness in right lower flank; no kedar CVA tenderness    MS:  Normal ROM; no bilateral lower extremity edema  Skin:  Warm; non-diaphoretic; no pallor  Neuro:  Awake; A&Ox3; normal strength  Psych: Normal mood and affect; normal speech  Vitals reviewed.    Emergency Department Course     Imaging:  Radiology findings were communicated with the patient who voiced understanding of the findings.    Retroperitoneal US  Final Result  IMPRESSION: Mild right hydronephrosis.    KERWIN COYLE MD  Reading per radiology    Laboratory:  Laboratory findings were communicated with the patient who voiced understanding of the findings.    Ua with microscopic: small blood (A), mucous present (A)  Hepatic panel: negative  Lipase: 99  CRP: 6.1  CBC: WBC 7.2, HGB 16,   BMP: GFR 57 (L), o/w WNL (Creatinine 1.39 (H))    Interventions:  0045 Dilaudid 0.5 mg IV  0051 Toradol 15 mg IV  0048 Zofran 4 mg IV    Emergency Department Course:  Nursing notes and vitals reviewed.  I performed an exam of the patient as documented above.   0207: Patient updated on findings and plan.  I discussed the treatment plan with the patient and wife. They expressed understanding of this plan and consented to discharge. Patient will be discharged home with instructions for care and follow up. In addition, the patient will return to the emergency department if his symptoms persist, worsen, if new symptoms arise or if there is any concern.  All questions were  answered.    I personally reviewed the lab and imaging results with the patient and answered all related questions prior to discharge.  Impression & Plan      Medical Decision Making:  Talon is a 41-year-old male presents with right lower flank and right lower quadrant abdominal pain that his persisted since he was diagnosed about 3-1/2 days ago with right-sided ureterolithiasis.  Patient is concerned because he did seem to have improvement for about a day and a half and then the pain returned today.  He is also concerned because he did have one episode of hematuria though he states this has resolved.  He has not had any fever.  Exam is reassuring with only minimal tenderness in the right lower quadrant and right lower flank.     Patient was given Dilaudid per nursing protocol.  He was given Zofran and Toradol as well.  LFTs and lipase are unremarkable.  CRP is normal.  BMP reveals a creatinine of 1.39 which is grossly unchanged from prior visit.  CBC is unremarkable.  Patient underwent a renal ultrasound which reveals mild right-sided hydronephrosis which was also seen on CT previous visit.  Thus, it does not appear that he has had any change in his condition since last visit. UA without evidence of infection. UCx from prior visit negative.    Patient was reevaluated and states that he is feeling well.  I had a long discussion with the patient regarding the workup and continued expectant management given he has no infection, severe, hydronephrosis, and pain is controlled. Kidney stone was noted to be four millimeters on CT and I suspect it will spontaneously pass.  I recommended patient call urology in the morning to schedule first available appointment.  I discussed appropriate use of his home medications and provided a refill of Norco.  I provided strict return precautions including uncontrolled pain and fever greater than 100.4F.  I answered all the patient's questions and he verbalized understanding.  Amenable  to discharge.    Diagnosis:    ICD-10-CM    1. Ureterolithiasis N20.1    2. Renal colic N23      Disposition:   Discharged    Discharge Medications:  Discharge Medication List as of 11/20/2017  2:38 AM      START taking these medications    Details   !! HYDROcodone-acetaminophen (NORCO) 5-325 MG per tablet Take 1 tablet by mouth every 6 hours as needed for moderate to severe pain, Disp-8 tablet, R-0, Local Print       !! - Potential duplicate medications found. Please discuss with provider.          Scribe Disclosure:  I, Wing Mcleod, am serving as a scribe at 2:55 AM on 11/20/2017 to document services personally performed by Nicole Kidd MD,based on my observations and the provider's statements to me.       EMERGENCY DEPARTMENT       Nicole Kidd MD  11/20/17 0353

## 2017-11-21 ENCOUNTER — OFFICE VISIT (OUTPATIENT)
Dept: UROLOGY | Facility: CLINIC | Age: 42
End: 2017-11-21
Payer: COMMERCIAL

## 2017-11-21 VITALS
OXYGEN SATURATION: 96 % | WEIGHT: 250 LBS | BODY MASS INDEX: 31.08 KG/M2 | HEART RATE: 83 BPM | HEIGHT: 75 IN | DIASTOLIC BLOOD PRESSURE: 82 MMHG | SYSTOLIC BLOOD PRESSURE: 116 MMHG

## 2017-11-21 DIAGNOSIS — N20.1 CALCULUS OF URETER: Primary | ICD-10-CM

## 2017-11-21 PROCEDURE — 99203 OFFICE O/P NEW LOW 30 MIN: CPT | Performed by: UROLOGY

## 2017-11-21 RX ORDER — OXYCODONE HYDROCHLORIDE 5 MG/1
5 TABLET ORAL EVERY 4 HOURS PRN
Qty: 30 TABLET | Refills: 0 | Status: SHIPPED | OUTPATIENT
Start: 2017-11-21

## 2017-11-21 RX ORDER — ONDANSETRON 4 MG/1
4 TABLET, ORALLY DISINTEGRATING ORAL EVERY 8 HOURS PRN
Qty: 30 TABLET | Refills: 0 | Status: SHIPPED | OUTPATIENT
Start: 2017-11-21

## 2017-11-21 RX ORDER — TAMSULOSIN HYDROCHLORIDE 0.4 MG/1
0.4 CAPSULE ORAL AT BEDTIME
Qty: 10 CAPSULE | Refills: 0 | Status: SHIPPED | OUTPATIENT
Start: 2017-11-21

## 2017-11-21 ASSESSMENT — PAIN SCALES - GENERAL: PAINLEVEL: MODERATE PAIN (4)

## 2017-11-21 NOTE — LETTER
"11/21/2017       RE: Talon Millard  [address] [facility] [address]     Dear Colleague,    Thank you for referring your patient, Talon Millard, to the [facility] UROLOGY CLINIC [place] at [facility]. Please see a copy of my visit note below.      SUBJECTIVE:                                                      Talon Millard is a 41 year old male who comes in for problems related to kidney stones.      Patient presents for evaluation and management of ureteral calculus. Patient has presented twice to the ED with complaints of flank pain on the right. Found to have a 4mm distal ureteral calculus. Patient denies stone passage and continues to note pain for which he is taking scheduled analgesia.     We discussed stone passage rates, stone clearance, pain management, rx regimen in detail.         ROS:  CONSTITUTIONAL:NEGATIVE for fever, chills, change in weight  INTEGUMENTARY/SKIN: NEGATIVE for worrisome rashes, moles or lesions  EYES: NEGATIVE for vision changes or irritation  ENT/MOUTH: NEGATIVE for ear, mouth and throat problems  RESP:NEGATIVE for significant cough or SOB  CV: NEGATIVE for chest pain, palpitations or peripheral edema  GI: NEGATIVE for nausea, abdominal pain, heartburn, or change in bowel habits  MUSCULOSKELETAL: NEGATIVE for significant arthralgias or myalgia  PSYCHIATRIC: NEGATIVE for changes in mood or affect    History reviewed. No pertinent past medical history.    History reviewed. No pertinent surgical history.    Family History   Problem Relation Age of Onset     HEART DISEASE Mother        Social History   Substance Use Topics     Smoking status: Light Tobacco Smoker     Smokeless tobacco: Never Used     Alcohol use Yes         OBJECTIVE:                                                    /82 (BP Location: Left arm, Patient Position: Sitting, Cuff Size: Adult Large)  Pulse 83  Ht 1.905 m (6' 3\")  Wt 113.4 kg (250 lb)  " SpO2 96%  BMI 31.25 kg/m2  Body mass index is 31.25 kg/(m^2).    EXAM:        GENERAL APPEARANCE: healthy, alert and no distress  RESP: negative   CV: negative   Abdomen: Abdomen soft, non-tender  SKIN: no suspicious lesions or rashes    IMPRESSION: 40 y/o M with distal ureteral calculus, 4mm, on the right         Diagnostic test results:  CT scan reviewed with the patient      ASSESSMENT/PLAN:                                                        ICD-10-CM    1. Calculus of ureter N20.1 oxyCODONE IR (ROXICODONE) 5 MG tablet     ondansetron (ZOFRAN ODT) 4 MG ODT tab     Erin-Operative Worksheet  (Urology General)     tamsulosin (FLOMAX) 0.4 MG capsule       Will schedule for right ureteroscopy laser lithotripsy, stent placement next available     Simin Em MD  [facility] UROLOGY CLINIC [place]    I spent over 30 minutes with the patient.  Over half this time was spent on counseling for ureteral calculus.        Again, thank you for allowing me to participate in the care of your patient.      Sincerely,    Simin Em MD

## 2017-11-21 NOTE — PROGRESS NOTES
"  SUBJECTIVE:                                                      Talon Millard is a 41 year old male who comes in for problems related to kidney stones.      Patient presents for evaluation and management of ureteral calculus. Patient has presented twice to the ED with complaints of flank pain on the right. Found to have a 4mm distal ureteral calculus. Patient denies stone passage and continues to note pain for which he is taking scheduled analgesia.     We discussed stone passage rates, stone clearance, pain management, rx regimen in detail.         ROS:  CONSTITUTIONAL:NEGATIVE for fever, chills, change in weight  INTEGUMENTARY/SKIN: NEGATIVE for worrisome rashes, moles or lesions  EYES: NEGATIVE for vision changes or irritation  ENT/MOUTH: NEGATIVE for ear, mouth and throat problems  RESP:NEGATIVE for significant cough or SOB  CV: NEGATIVE for chest pain, palpitations or peripheral edema  GI: NEGATIVE for nausea, abdominal pain, heartburn, or change in bowel habits  MUSCULOSKELETAL: NEGATIVE for significant arthralgias or myalgia  PSYCHIATRIC: NEGATIVE for changes in mood or affect    History reviewed. No pertinent past medical history.    History reviewed. No pertinent surgical history.    Family History   Problem Relation Age of Onset     HEART DISEASE Mother        Social History   Substance Use Topics     Smoking status: Light Tobacco Smoker     Smokeless tobacco: Never Used     Alcohol use Yes         OBJECTIVE:                                                    /82 (BP Location: Left arm, Patient Position: Sitting, Cuff Size: Adult Large)  Pulse 83  Ht 1.905 m (6' 3\")  Wt 113.4 kg (250 lb)  SpO2 96%  BMI 31.25 kg/m2  Body mass index is 31.25 kg/(m^2).    EXAM:        GENERAL APPEARANCE: healthy, alert and no distress  RESP: negative   CV: negative   Abdomen: Abdomen soft, non-tender  SKIN: no suspicious lesions or rashes    IMPRESSION: 40 y/o M with distal ureteral calculus, 4mm, on the right "         Diagnostic test results:  CT scan reviewed with the patient      ASSESSMENT/PLAN:                                                        ICD-10-CM    1. Calculus of ureter N20.1 oxyCODONE IR (ROXICODONE) 5 MG tablet     ondansetron (ZOFRAN ODT) 4 MG ODT tab     Erin-Operative Worksheet  (Urology General)     tamsulosin (FLOMAX) 0.4 MG capsule       Will schedule for right ureteroscopy laser lithotripsy, stent placement next available     Simin Em MD  [facility] UROLOGY CLINIC [place]    MARII spent over 30 minutes with the patient.  Over half this time was spent on counseling for ureteral calculus.

## 2017-11-21 NOTE — MR AVS SNAPSHOT
"              After Visit Summary   2017    Talon Millard    MRN: [id number]           Patient Information     Date Of Birth          1975        Visit Information        Provider Department      2017 9:45 AM Simin Em MD [facility] [place]        Today's Diagnoses     Calculus of ureter    -  1       Follow-ups after your visit        Who to contact     If you have questions or need follow up information about today's clinic visit or your schedule please contact [facility] UROLOGY CLIN[facility] [place] directly at [phone].  Normal or non-critical lab and imaging results will be communicated to you by [employer], letter or phone within 4 business days after the clinic has received the results. If you do not hear from us within 7 days, please contact the clinic through [employer] or phone. If you have a critical or abnormal lab result, we will notify you by phone as soon as possible.  Submit refill requests through [employer] or call your pharmacy and they will forward the refill request to us. Please allow 3 business days for your refill to be completed.          Additional Information About Your Visit        MyChart Information     [employer] lets you send messages to your doctor, view your test results, renew your prescriptions, schedule appointments and more. To sign up, go to [email or web address] . Click on \"Log in\" on the left side of the screen, which will take you to the Welcome page. Then click on \"Sign up Now\" on the right side of the page.     You will be asked to enter the access code listed below, as well as some personal information. Please follow the directions to create your username and password.     Your access code is: [email or web address]  Expires: 2018  5:17 AM     Your access code will  in 90 days. If you need help or a new code, please call your [facility] or [phone].        Care EveryWhere ID     This is " "your Care EveryWhere ID. This could be used by other organizations to access your [facility] medical records  [id number]        Your Vitals Were     Pulse Height Pulse Oximetry BMI (Body Mass Index)          83 1.905 m (6' 3\") 96% 31.25 kg/m2         Blood Pressure from Last 3 Encounters:   11/21/17 116/82   11/20/17 112/78   11/16/17 111/77    Weight from Last 3 Encounters:   11/21/17 113.4 kg (250 lb)   11/19/17 114.3 kg (252 lb)   11/16/17 113.4 kg (250 lb)              We Performed the Following     Erin-Operative Worksheet  (Urology General)          Today's Medication Changes          These changes are accurate as of: 11/21/17 11:59 PM.  If you have any questions, ask your nurse or doctor.               Start taking these medicines.        Dose/Directions    oxyCODONE IR 5 MG tablet   Commonly known as:  ROXICODONE   Used for:  Calculus of ureter   Started by:  Simin Em MD        Dose:  5 mg   Take 1 tablet (5 mg) by mouth every 4 hours as needed for pain maximum 6 tablet(s) per day   Quantity:  30 tablet   Refills:  0            Where to get your medicines      These medications were sent to [id number] - [place], [place] - [address] AT [facility] [place] [facility] [address]  [address], [place] [address]    Hours:  24-hours Phone:  [phone]     ondansetron 4 MG ODT tab    tamsulosin 0.4 MG capsule         Some of these will need a paper prescription and others can be bought over the counter.  Ask your nurse if you have questions.     Bring a paper prescription for each of these medications     oxyCODONE IR 5 MG tablet                Primary Care Provider Fax #    Physician No Ref-Primary [phone]       No address on file        Equal Access to Services     DUNCAN COUGHLIN AH: Cornelius Noriega, waperico luqadaha, qaybta kaalmada noe, dorina prado. [facility] [phone].    ATENCIÓN: Si jeni marks " disposición servicios gratuitos de asistencia lingüística. Ayala gonzáles [phone].    We comply with applicable federal civil rights laws and [place] laws. We do not discriminate on the basis of race, color, national origin, age, disability, sex, sexual orientation, or gender identity.            Thank you!     Thank you for choosing [facility] UROLOG[facility]  for your care. Our goal is always to provide you with excellent care. Hearing back from our patients is one way we can continue to improve our services. Please take a few minutes to complete the written survey that you may receive in the mail after your visit with us. Thank you!             Your Updated Medication List - Protect others around you: Learn how to safely use, store and throw away your medicines at www.disposemymeds.org.          This list is accurate as of: 11/21/17 11:59 PM.  Always use your most recent med list.                   Brand Name Dispense Instructions for use Diagnosis    HYDROcodone-acetaminophen 5-325 MG per tablet    NORCO    15 tablet    Take 1-2 tablets by mouth every 4 hours as needed for moderate to severe pain        ondansetron 4 MG ODT tab    ZOFRAN ODT    30 tablet    Take 1 tablet (4 mg) by mouth every 8 hours as needed for nausea    Calculus of ureter       oxyCODONE IR 5 MG tablet    ROXICODONE    30 tablet    Take 1 tablet (5 mg) by mouth every 4 hours as needed for pain maximum 6 tablet(s) per day    Calculus of ureter       tamsulosin 0.4 MG capsule    FLOMAX    10 capsule    Take 1 capsule (0.4 mg) by mouth At Bedtime    Calculus of ureter

## 2017-11-22 ENCOUNTER — TELEPHONE (OUTPATIENT)
Dept: UROLOGY | Facility: CLINIC | Age: 42
End: 2017-11-22

## 2017-11-22 DIAGNOSIS — N20.1 URETERAL STONE: Primary | ICD-10-CM

## 2017-11-22 DIAGNOSIS — N20.1 URETERAL STONE: ICD-10-CM

## 2017-11-22 PROCEDURE — 99000 SPECIMEN HANDLING OFFICE-LAB: CPT | Performed by: UROLOGY

## 2017-11-22 PROCEDURE — 82365 CALCULUS SPECTROSCOPY: CPT | Mod: 90 | Performed by: UROLOGY

## 2017-11-22 NOTE — TELEPHONE ENCOUNTER
Patient called to inform that he had passed kidney stone.  Patient was instructed to bring stone in to clinic for analysis. (Order was placed in [facility])  Patient's surgery was cancelled by 's.  Patient also mentioned that he needed some [employer] paperwork filled out for his work.  This nurse explained, that our business office would take care of paperwork.    Melinda Parson LPN

## 2017-11-26 LAB
APPEARANCE STONE: NORMAL
COMPN STONE: NORMAL
NUMBER STONE: 1
SIZE STONE: NORMAL MM
WT STONE: 5 MG

## 2024-02-08 NOTE — LETTER
November 20, 2017      To Whom It May Concern:      Talon Millard was seen in our Emergency Department today, 11/20/17. Please excuse him from work 11/20/17 and 11/21/17.    Sincerely,        Nicole Kidd MD        
No
[id number]